# Patient Record
Sex: MALE | Race: WHITE | NOT HISPANIC OR LATINO | Employment: UNEMPLOYED | URBAN - METROPOLITAN AREA
[De-identification: names, ages, dates, MRNs, and addresses within clinical notes are randomized per-mention and may not be internally consistent; named-entity substitution may affect disease eponyms.]

---

## 2017-10-08 PROBLEM — F17.200 SMOKER: Chronic | Status: ACTIVE | Noted: 2017-10-08

## 2017-10-08 PROBLEM — F10.20 ALCOHOL DEPENDENCE, DAILY USE: Chronic | Status: ACTIVE | Noted: 2017-10-08

## 2017-10-08 PROBLEM — K43.6 INCARCERATED VENTRAL HERNIA: Status: ACTIVE | Noted: 2017-10-08

## 2017-12-04 PROBLEM — I10 ESSENTIAL HYPERTENSION: Status: ACTIVE | Noted: 2017-12-04

## 2018-01-10 ENCOUNTER — CLINICAL SUPPORT (OUTPATIENT)
Dept: SMOKING CESSATION | Facility: CLINIC | Age: 58
End: 2018-01-10
Payer: COMMERCIAL

## 2018-01-10 DIAGNOSIS — F17.210 MODERATE SMOKER (20 OR LESS PER DAY): Primary | ICD-10-CM

## 2018-01-10 PROCEDURE — 99404 PREV MED CNSL INDIV APPRX 60: CPT | Mod: S$GLB,,,

## 2018-01-10 RX ORDER — IBUPROFEN 200 MG
1 TABLET ORAL DAILY
Qty: 14 PATCH | Refills: 0 | Status: SHIPPED | OUTPATIENT
Start: 2018-01-10 | End: 2018-10-01

## 2018-01-24 ENCOUNTER — TELEPHONE (OUTPATIENT)
Dept: SMOKING CESSATION | Facility: CLINIC | Age: 58
End: 2018-01-24

## 2018-02-12 ENCOUNTER — TELEPHONE (OUTPATIENT)
Dept: SMOKING CESSATION | Facility: CLINIC | Age: 58
End: 2018-02-12

## 2018-02-12 NOTE — TELEPHONE ENCOUNTER
Contacted pt to discussed missed follow up appt; pt is not focused on quit attempt at this time and will contact clinic when ready to quit

## 2018-04-24 ENCOUNTER — TELEPHONE (OUTPATIENT)
Dept: SMOKING CESSATION | Facility: CLINIC | Age: 58
End: 2018-04-24

## 2018-05-02 ENCOUNTER — CLINICAL SUPPORT (OUTPATIENT)
Dept: SMOKING CESSATION | Facility: CLINIC | Age: 58
End: 2018-05-02
Payer: COMMERCIAL

## 2018-05-02 DIAGNOSIS — F17.200 NICOTINE DEPENDENCE: Primary | ICD-10-CM

## 2018-05-02 PROCEDURE — 99407 BEHAV CHNG SMOKING > 10 MIN: CPT | Mod: S$GLB,,,

## 2018-05-14 ENCOUNTER — CLINICAL SUPPORT (OUTPATIENT)
Dept: SMOKING CESSATION | Facility: CLINIC | Age: 58
End: 2018-05-14
Payer: COMMERCIAL

## 2018-05-14 DIAGNOSIS — F17.210 MODERATE SMOKER (20 OR LESS PER DAY): Primary | ICD-10-CM

## 2018-05-14 PROCEDURE — 99404 PREV MED CNSL INDIV APPRX 60: CPT | Mod: S$GLB,,,

## 2018-05-14 RX ORDER — VARENICLINE TARTRATE 0.5 (11)-1
KIT ORAL
Qty: 1 PACKAGE | Refills: 0 | Status: SHIPPED | OUTPATIENT
Start: 2018-05-14 | End: 2018-06-14

## 2018-05-14 NOTE — PROGRESS NOTES
Patient currently smoking a pack per day and will begin 1:1 cessation therapy with [facility]. Patient will begin prescribed tobacco cessation medication regimen of starter pack of Chantix.

## 2018-05-30 ENCOUNTER — CLINICAL SUPPORT (OUTPATIENT)
Dept: SMOKING CESSATION | Facility: CLINIC | Age: 58
End: 2018-05-30
Payer: COMMERCIAL

## 2018-05-30 DIAGNOSIS — F17.210 MODERATE SMOKER (20 OR LESS PER DAY): Primary | ICD-10-CM

## 2018-05-30 PROCEDURE — 99406 BEHAV CHNG SMOKING 3-10 MIN: CPT | Mod: S$GLB,,,

## 2018-05-30 NOTE — PROGRESS NOTES
contacted pt to discuss missed follow up appt; pt stated forgot about appt and reports has not started medication yet due to fear of side effects, reviewed with pt possible side effects and proper medication usage; pt verbalized understanding and stated will start medication on 5/31, follow up visit in two weeks

## 2018-06-13 ENCOUNTER — TELEPHONE (OUTPATIENT)
Dept: SMOKING CESSATION | Facility: CLINIC | Age: 58
End: 2018-06-13

## 2018-07-09 ENCOUNTER — CLINICAL SUPPORT (OUTPATIENT)
Dept: SMOKING CESSATION | Facility: CLINIC | Age: 58
End: 2018-07-09
Payer: COMMERCIAL

## 2018-07-09 DIAGNOSIS — F17.200 NICOTINE DEPENDENCE: Primary | ICD-10-CM

## 2018-07-09 PROCEDURE — 99406 BEHAV CHNG SMOKING 3-10 MIN: CPT | Mod: S$GLB,,,

## 2018-07-09 NOTE — PROGRESS NOTES
Spoke with patients wife, she stated that he was sleeping th time of call. Spoke to her in regards  regards to smoking cessation progress for 6 month follow up,  states not tobacco free at this time. Patient was currently enroll in smoking cessation program and working on Quit #2. Informed patients wife of his benefit period, future follow ups, and contact information. Will complete smart form 6 month phone follow up for Quit attempt #1.

## 2018-09-29 PROBLEM — K43.9 VENTRAL HERNIA: Status: ACTIVE | Noted: 2018-09-29

## 2018-10-09 PROBLEM — K43.6 INCARCERATED VENTRAL HERNIA: Status: RESOLVED | Noted: 2017-10-08 | Resolved: 2018-10-09

## 2018-10-09 PROBLEM — K43.2 INCISIONAL HERNIA, WITHOUT OBSTRUCTION OR GANGRENE: Status: ACTIVE | Noted: 2018-10-09

## 2018-10-09 PROBLEM — K43.9 VENTRAL HERNIA: Status: RESOLVED | Noted: 2018-09-29 | Resolved: 2018-10-09

## 2019-01-16 ENCOUNTER — TELEPHONE (OUTPATIENT)
Dept: SMOKING CESSATION | Facility: CLINIC | Age: 59
End: 2019-01-16

## 2019-01-31 ENCOUNTER — TELEPHONE (OUTPATIENT)
Dept: SMOKING CESSATION | Facility: CLINIC | Age: 59
End: 2019-01-31

## 2019-01-31 NOTE — TELEPHONE ENCOUNTER
2nd attempt wife stated that he was sleeping left message and contact information  regarding smoking cessation quit . 2 episode 12 month phone follow up.

## 2019-06-12 ENCOUNTER — TELEPHONE (OUTPATIENT)
Dept: SMOKING CESSATION | Facility: CLINIC | Age: 59
End: 2019-06-12

## 2019-07-03 ENCOUNTER — CLINICAL SUPPORT (OUTPATIENT)
Dept: SMOKING CESSATION | Facility: CLINIC | Age: 59
End: 2019-07-03
Payer: COMMERCIAL

## 2019-07-03 DIAGNOSIS — F17.200 NICOTINE DEPENDENCE: Primary | ICD-10-CM

## 2019-07-03 PROCEDURE — 99407 PR TOBACCO USE CESSATION INTENSIVE >10 MINUTES: ICD-10-PCS | Mod: S$GLB,,,

## 2019-07-03 PROCEDURE — 99407 BEHAV CHNG SMOKING > 10 MIN: CPT | Mod: S$GLB,,,

## 2019-07-03 NOTE — PROGRESS NOTES
Successful contact with patient regarding tobacco cessation quit #2. Pt states, he was unable to become tobacco free and he's not ready to commit to quitting at this time.  Pt informed of his benefit status and contact information to schedule an appointment when he's ready. Will update the tobacco cessation smart form for 6 and 12 months on quit #2 and resolve the episode.

## 2019-11-13 PROBLEM — E87.1 HYPONATREMIA: Status: ACTIVE | Noted: 2019-11-13

## 2019-11-13 PROBLEM — E22.2 SIADH (SYNDROME OF INAPPROPRIATE ADH PRODUCTION): Status: ACTIVE | Noted: 2019-11-13

## 2019-11-13 PROBLEM — K59.00 CONSTIPATION: Status: ACTIVE | Noted: 2019-11-13

## 2019-11-13 PROBLEM — R63.4 WEIGHT LOSS: Status: ACTIVE | Noted: 2019-11-13

## 2020-08-11 ENCOUNTER — LAB VISIT (OUTPATIENT)
Dept: PRIMARY CARE CLINIC | Facility: OTHER | Age: 60
End: 2020-08-11
Attending: INTERNAL MEDICINE
Payer: OTHER GOVERNMENT

## 2020-08-11 DIAGNOSIS — Z03.818 ENCOUNTER FOR OBSERVATION FOR SUSPECTED EXPOSURE TO OTHER BIOLOGICAL AGENTS RULED OUT: ICD-10-CM

## 2020-08-11 PROCEDURE — U0003 INFECTIOUS AGENT DETECTION BY NUCLEIC ACID (DNA OR RNA); SEVERE ACUTE RESPIRATORY SYNDROME CORONAVIRUS 2 (SARS-COV-2) (CORONAVIRUS DISEASE [COVID-19]), AMPLIFIED PROBE TECHNIQUE, MAKING USE OF HIGH THROUGHPUT TECHNOLOGIES AS DESCRIBED BY CMS-2020-01-R: HCPCS

## 2020-08-14 LAB — SARS-COV-2 RNA RESP QL NAA+PROBE: NORMAL

## 2020-11-17 PROBLEM — C18.9 MALIGNANT NEOPLASM OF COLON: Status: ACTIVE | Noted: 2020-11-17

## 2020-11-18 ENCOUNTER — SPECIALTY PHARMACY (OUTPATIENT)
Dept: PHARMACY | Facility: CLINIC | Age: 60
End: 2020-11-18

## 2020-11-18 DIAGNOSIS — C18.9 MALIGNANT NEOPLASM OF COLON, UNSPECIFIED PART OF COLON: Primary | ICD-10-CM

## 2020-11-18 NOTE — TELEPHONE ENCOUNTER
Patient is uninsured. Spoke with patient and let him know that we have received new script for Xeloda. Will work on assistance to see if we can get medication from .

## 2020-12-15 PROBLEM — R15.9 FECAL INCONTINENCE: Status: ACTIVE | Noted: 2020-12-15

## 2020-12-15 PROBLEM — K92.1 BLOOD IN STOOL: Status: ACTIVE | Noted: 2020-12-15

## 2021-05-06 ENCOUNTER — PATIENT MESSAGE (OUTPATIENT)
Dept: RESEARCH | Facility: HOSPITAL | Age: 61
End: 2021-05-06

## 2021-06-08 PROBLEM — R53.1 WEAKNESS: Status: ACTIVE | Noted: 2021-06-08

## 2021-06-08 PROBLEM — Z72.0 TOBACCO ABUSE: Status: ACTIVE | Noted: 2017-10-08

## 2021-06-08 PROBLEM — I21.4 NSTEMI (NON-ST ELEVATED MYOCARDIAL INFARCTION): Status: ACTIVE | Noted: 2021-06-08

## 2021-06-08 PROBLEM — M25.511 RIGHT SHOULDER PAIN: Status: ACTIVE | Noted: 2021-06-08

## 2021-06-08 PROBLEM — M62.82 RHABDOMYOLYSIS: Status: ACTIVE | Noted: 2021-06-08

## 2021-06-08 PROBLEM — D64.9 ANEMIA: Status: ACTIVE | Noted: 2021-06-08

## 2021-06-08 PROBLEM — F10.20 ALCOHOL DEPENDENCE, DAILY USE: Status: ACTIVE | Noted: 2017-10-08

## 2021-06-08 PROBLEM — D69.6 THROMBOCYTOPENIA: Status: ACTIVE | Noted: 2021-06-08

## 2021-06-08 PROBLEM — A41.9 SEPSIS: Status: ACTIVE | Noted: 2021-06-08

## 2021-06-09 PROBLEM — R78.81 BACTEREMIA: Status: ACTIVE | Noted: 2021-06-09

## 2021-06-09 PROBLEM — E44.0 MODERATE MALNUTRITION: Status: ACTIVE | Noted: 2021-06-09

## 2021-06-10 PROBLEM — B95.61 STAPHYLOCOCCUS AUREUS BACTEREMIA: Status: ACTIVE | Noted: 2021-06-09

## 2021-06-10 PROBLEM — I48.0 PAF (PAROXYSMAL ATRIAL FIBRILLATION): Status: ACTIVE | Noted: 2021-06-10

## 2021-06-11 PROBLEM — A41.01 STAPHYLOCOCCUS AUREUS BACTEREMIA WITH SEPSIS: Status: ACTIVE | Noted: 2021-06-08

## 2021-06-11 PROBLEM — E44.0 MODERATE MALNUTRITION: Status: ACTIVE | Noted: 2021-06-11

## 2021-06-12 PROBLEM — R78.81 MRSA BACTEREMIA: Status: ACTIVE | Noted: 2021-06-08

## 2021-06-12 PROBLEM — R44.3 HALLUCINATIONS: Status: ACTIVE | Noted: 2021-06-12

## 2021-06-12 PROBLEM — B95.62 MRSA BACTEREMIA: Status: ACTIVE | Noted: 2021-06-08

## 2021-06-19 PROBLEM — D69.6 THROMBOCYTOPENIA: Status: RESOLVED | Noted: 2021-06-08 | Resolved: 2021-06-19

## 2021-06-19 PROBLEM — R53.1 WEAKNESS: Status: RESOLVED | Noted: 2021-06-08 | Resolved: 2021-06-19

## 2021-06-22 PROBLEM — M62.82 RHABDOMYOLYSIS: Status: RESOLVED | Noted: 2021-06-08 | Resolved: 2021-06-22

## 2021-06-22 PROBLEM — I21.4 NSTEMI (NON-ST ELEVATED MYOCARDIAL INFARCTION): Status: RESOLVED | Noted: 2021-06-08 | Resolved: 2021-06-22

## 2021-06-22 PROBLEM — R44.3 HALLUCINATIONS: Status: RESOLVED | Noted: 2021-06-12 | Resolved: 2021-06-22

## 2021-06-22 PROBLEM — E87.1 HYPONATREMIA: Status: RESOLVED | Noted: 2019-11-13 | Resolved: 2021-06-22
